# Patient Record
Sex: MALE | Race: WHITE | NOT HISPANIC OR LATINO | Employment: UNEMPLOYED | URBAN - METROPOLITAN AREA
[De-identification: names, ages, dates, MRNs, and addresses within clinical notes are randomized per-mention and may not be internally consistent; named-entity substitution may affect disease eponyms.]

---

## 2022-09-12 ENCOUNTER — HOSPITAL ENCOUNTER (EMERGENCY)
Facility: CLINIC | Age: 22
Discharge: HOME | End: 2022-09-13
Attending: EMERGENCY MEDICINE | Admitting: EMERGENCY MEDICINE
Payer: COMMERCIAL

## 2022-09-12 VITALS
SYSTOLIC BLOOD PRESSURE: 155 MMHG | TEMPERATURE: 98.7 F | WEIGHT: 167 LBS | HEIGHT: 68 IN | OXYGEN SATURATION: 98 % | HEART RATE: 85 BPM | RESPIRATION RATE: 18 BRPM | BODY MASS INDEX: 25.31 KG/M2 | DIASTOLIC BLOOD PRESSURE: 87 MMHG

## 2022-09-12 DIAGNOSIS — S61.211A LACERATION OF LEFT INDEX FINGER WITHOUT FOREIGN BODY WITHOUT DAMAGE TO NAIL, INITIAL ENCOUNTER: ICD-10-CM

## 2022-09-12 PROCEDURE — 12001 RPR S/N/AX/GEN/TRNK 2.5CM/<: CPT | Performed by: EMERGENCY MEDICINE

## 2022-09-12 PROCEDURE — 99283 EMERGENCY DEPT VISIT LOW MDM: CPT | Mod: 25 | Performed by: EMERGENCY MEDICINE

## 2022-09-12 PROCEDURE — 99283 EMERGENCY DEPT VISIT LOW MDM: CPT | Performed by: EMERGENCY MEDICINE

## 2022-09-12 RX ORDER — LIDOCAINE HYDROCHLORIDE 10 MG/ML
10 INJECTION, SOLUTION EPIDURAL; INFILTRATION; INTRACAUDAL; PERINEURAL ONCE
Status: DISCONTINUED | OUTPATIENT
Start: 2022-09-12 | End: 2022-09-13 | Stop reason: HOSPADM

## 2022-09-12 RX ORDER — LIDOCAINE HYDROCHLORIDE AND EPINEPHRINE 10; 10 MG/ML; UG/ML
1 INJECTION, SOLUTION INFILTRATION; PERINEURAL ONCE
Status: DISCONTINUED | OUTPATIENT
Start: 2022-09-12 | End: 2022-09-13 | Stop reason: HOSPADM

## 2022-09-12 RX ORDER — LIDOCAINE HYDROCHLORIDE 10 MG/ML
INJECTION, SOLUTION EPIDURAL; INFILTRATION; INTRACAUDAL; PERINEURAL
Status: DISCONTINUED
Start: 2022-09-12 | End: 2022-09-13 | Stop reason: HOSPADM

## 2022-09-12 ASSESSMENT — ACTIVITIES OF DAILY LIVING (ADL): ADLS_ACUITY_SCORE: 33

## 2022-09-12 ASSESSMENT — ENCOUNTER SYMPTOMS: WOUND: 1

## 2022-09-13 NOTE — ED PROVIDER NOTES
"    [facility] ([facility])  9/12/22    History     Chief Complaint   Patient presents with     Laceration     The history is provided by the patient.     Denis Keller is a 21 year old male who is generally healthy who presents with a laceration to the left hand third finger.  It occurred with a sharp knife while cutting an onion.  The finger is neurovascularly intact but there is chronic steady bleeding from the wound.  He does not have any history of bleeding or clotting disorders is not immunocompromised.      This part of the medical record was transcribed by Isa Hare Medical Scribe, from a dictation done by Jocelyn Mckinney MD.     Past Medical History  No past medical history on file.  No past surgical history on file.  AZITHROMYCIN 200 MG/5ML PO SUSR  MEFLOQUINE  MG PO TABS      Allergies   Allergen Reactions     Malarone      Rash.     Family History  No family history on file.  Social History   Social History     Tobacco Use     Smoking status: Never Smoker   Substance Use Topics     Alcohol use: No     Drug use: No      Past medical history, past surgical history, medications, allergies, family history, and social history were reviewed with the patient. No additional pertinent items.       Review of Systems   Skin: Positive for wound (left third finger laceration).   All other systems reviewed and are negative.    A complete review of systems was performed with pertinent positives and negatives noted in the HPI, and all other systems negative.    Physical Exam   BP: (!) 157/90  Pulse: 79  Temp: 98.7  F (37.1  C)  Resp: 18  Height: 172.7 cm (5' 8\")  Weight: 75.8 kg (167 lb)  SpO2: 94 %     Physical Exam  Gen:A&Ox3, no acute distress  UE: + radial pulse and normal hand perfusion. Left 2nd finger with 1cm laceration to the middle phalanx on the palmar side. Slow bleeding from wound. No visible foreign bodies or tendon/deep structure injuries noted.   LE:no " traumatic injuries, skin normal  Neuro: left hand sensation intact. Motor function of the left 2nd finger is normal.   Skin: no rashes or ecchymoses    ED Course      [facility]    -Laceration Repair    Date/Time: 9/13/2022 2:04 AM  Performed by: Jocelyn Mckinney MD  Authorized by: Jocelyn Mckinney MD     Risks, benefits and alternatives discussed.      ANESTHESIA (see MAR for exact dosages):     Anesthesia method:  Local infiltration and nerve block    Local anesthetic:  Lidocaine 1% WITH epi    Block needle gauge:  27 G    Block anesthetic:  Lidocaine 1% w/o epi    Block technique:  2nd finger digital block    Block outcome:  Anesthesia achieved      LACERATION DETAILS     Location:  Finger    Finger location:  L index finger    Length (cm):  1    REPAIR TYPE:     Repair type:  Simple      EXPLORATION:     Hemostasis achieved with:  Epinephrine and tourniquet    Wound exploration: wound explored through full range of motion and entire depth of wound probed and visualized      Wound extent: no nerve damage, no tendon damage and no vascular damage      Contaminated: no      TREATMENT:     Area cleansed with:  Saline    Amount of cleaning:  Standard    Irrigation solution:  Sterile saline    SKIN REPAIR     Repair method:  Sutures    Suture size:  4-0    Suture material:  Nylon    Number of sutures:  3    APPROXIMATION     Approximation:  Close    POST-PROCEDURE DETAILS     Dressing:  Antibiotic ointment and tube gauze        PROCEDURE    Patient Tolerance:  Patient tolerated the procedure well with no immediate complications         No results found for any visits on 09/12/22.  Medications - No data to display     Assessments & Plan (with Medical Decision Making)   21-year-old male presenting with finger laceration.    Exam and wound exploration without nerve or tendon injury. Normal digit perfusion.   See procedure note above for information on digital block,  wound irrigation and closure.    The patient is up-to-date on tetanus   Discharged   Suture removal in 10 days.    This part of the medical record was transcribed by Isa Hare Medical Scribe, from a dictation done by Jocelyn Mckinney MD.     I have reviewed the nursing notes. I have reviewed the findings, diagnosis, plan and need for follow up with the patient.    Discharge Medication List as of 9/12/2022 11:55 PM          Final diagnoses:   Laceration of left index finger without foreign body without damage to nail, initial encounter       --  Jocelyn Mckinney MD  [facility] EMERGENCY DEPARTMENT  9/12/2022     Jocelyn Mckinney MD  09/13/22 0214

## 2022-09-13 NOTE — ED TRIAGE NOTES
Patient reports that around 2030, he was cutting onions and he cut his left pointer finger. Patient reports that he is still able to move his finger. Complains of slight pain.

## 2022-09-13 NOTE — DISCHARGE INSTRUCTIONS
Thank you for coming to the [facility] Emergency Department.         *LACERATION (All: sutures, staples, tape, glue)  A laceration is a cut through the skin. This will usually require stitches (sutures) or staples if it is deep. Minor cuts may be treated with a tape closure ( Steri-Strips ) or Dermabond skin glue.    HOME CARE:  EXTREMITY, FACE or TRUNK WOUNDS: Keep the wound clean and dry. If a bandage was applied and it becomes wet or dirty, replace it. Otherwise, leave it in place for the first 24 hours.  If stitches or staples were used, clean the wound daily. Protect the wound from sunlight and tanning lamps.  After removing the bandage, wash the area with soap and water. Use a wet cotton swab (Q tip) to loosen and remove any blood or crust that forms.  After cleaning, apply a thin layer of Polysporin or Bacitracin ointment. This will keep the wound clean and make it easier to remove the stitches or staples. Reapply a fresh bandage.  You may remove the bandage to shower as usual after the first 24 hours, but do not soak the area in water (no swimming) until the stitches or staples are removed.  If Steri-Strips were used, keep the area clean and dry. If it becomes wet, blot it dry with a towel. It is okay to take a brief shower, but avoid scrubbing the area.  If Dermabond skin adhesive was used, do not scratch, rub or pick at the adhesive film. Do not place tape directly over the film. Do not apply liquid, ointment or creams to the wound while the film is in place. Do not clean the wound with peroxide and do not apply ointments. Avoid activities that cause heavy sweating until the film has fallen off. Protect the wound from prolonged exposure to sunlight or tanning lamps. You may shower as usual but do not soak the wound in water (no baths or swimming). The film will fall off by itself in 5-10 days.  SCALP WOUNDS: During the first two days, you may carefully rinse your hair in the  shower to remove blood, glass or dirt particles. After two days, you may shower and shampoo your hair normally. Do not soak your scalp in the tub or go swimming until the stitches or staples have been removed.  MOUTH WOUNDS: Eat soft foods to reduce pain. If the cut is inside of your mouth, clean by rinsing after each meal and at bedtime with a mixture of equal parts water and Hydrogen Peroxide (do not swallow!). Or, you can use a cotton swab to directly apply Hydrogen Peroxide onto the cut.  You may use acetaminophen (Tylenol) 650-1000 mg every 6 hours or ibuprofen (Motrin, Advil) 600 mg every 6-8 hours with food to control pain, if you are able to take these medicines. [NOTE: If you have chronic liver or kidney disease or ever had a stomach ulcer or GI bleeding, talk with your doctor before using these medicines.]  Use sunscreen on the area for 6 months after the wound heals to keep the scar from getting darker.   FOLLOW UP: 10 days for suture removal  GET PROMPT MEDICAL ATTENTION if any of the following occur:  Increasing pain in the wound  Redness, swelling or pus coming from the wound  Fever over 101 F (38.3 C) oral  If stitches or staples come apart or fall out or if Steri-Strips fall off before seven days  If the wound edges re-open  Bleeding not controlled by direct pressure    [id number] [employer], [address] [place] [address], [place] [address]. All rights reserved. This information is not intended as a substitute for professional medical care. Always follow your healthcare professional's instructions.